# Patient Record
Sex: FEMALE | Race: WHITE
[De-identification: names, ages, dates, MRNs, and addresses within clinical notes are randomized per-mention and may not be internally consistent; named-entity substitution may affect disease eponyms.]

---

## 2018-05-07 ENCOUNTER — HOSPITAL ENCOUNTER (OUTPATIENT)
Dept: HOSPITAL 92 - RAD | Age: 23
Discharge: HOME | End: 2018-05-07
Attending: INTERNAL MEDICINE
Payer: COMMERCIAL

## 2018-05-07 DIAGNOSIS — R06.00: Primary | ICD-10-CM

## 2018-05-07 PROCEDURE — 80053 COMPREHEN METABOLIC PANEL: CPT

## 2018-05-07 PROCEDURE — 36415 COLL VENOUS BLD VENIPUNCTURE: CPT

## 2018-05-07 PROCEDURE — 85025 COMPLETE CBC W/AUTO DIFF WBC: CPT

## 2018-05-07 PROCEDURE — 82103 ALPHA-1-ANTITRYPSIN TOTAL: CPT

## 2018-05-07 PROCEDURE — 71046 X-RAY EXAM CHEST 2 VIEWS: CPT

## 2018-05-07 NOTE — RAD
CHEST PA AND LATERAL 2 VIEWS:

 

Date:  05/07/18 

 

HISTORY:  

22-year-old female with history of dyspnea. 

 

FINDINGS:

Heart size is within normal limits. Lungs appear clear. No confluent pneumonia, overt edema, or pleur
al effusion. 

 

IMPRESSION: 

No acute intrathoracic disease. No old studies. 

 

 

POS: [facility]

## 2018-05-15 ENCOUNTER — HOSPITAL ENCOUNTER (OUTPATIENT)
Dept: HOSPITAL 92 - NM | Age: 23
Discharge: HOME | End: 2018-05-15
Attending: INTERNAL MEDICINE
Payer: COMMERCIAL

## 2018-05-15 DIAGNOSIS — I26.99: Primary | ICD-10-CM

## 2018-05-15 PROCEDURE — 94727 GAS DIL/WSHOT DETER LNG VOL: CPT

## 2018-05-15 PROCEDURE — 94729 DIFFUSING CAPACITY: CPT

## 2018-05-15 PROCEDURE — 71046 X-RAY EXAM CHEST 2 VIEWS: CPT

## 2018-05-15 PROCEDURE — A9558 XE133 XENON 10MCI: HCPCS

## 2018-05-15 PROCEDURE — 94060 EVALUATION OF WHEEZING: CPT

## 2018-05-15 PROCEDURE — A9540 TC99M MAA: HCPCS

## 2018-05-15 PROCEDURE — 78582 LUNG VENTILAT&PERFUS IMAGING: CPT

## 2018-05-15 NOTE — RAD
CHEST 2 VIEWS:

 

Date:  05/15/18 

 

HISTORY:  

Dyspnea. 

 

COMPARISON:  

05/07/18. 

FINDINGS:

Cardiac silhouette and pulmonary vasculature are unremarkable. Mediastinum is midline. Lungs are well
 inflated. No confluent air space consolidation, pneumothorax, or pleural fluid. 

 

IMPRESSION: 

No active cardiopulmonary abnormalities are demonstrated. 

 

 

POS: [facility]

## 2018-05-15 NOTE — NM
RADIONUCLIDE VENTILSION PERFUSION LUNG SCAN:

 

Date:  05/15/18 

 

HISTORY:  

Chest pain. Dyspnea. 

 

FINDINGS:

Ventilation images show good uptake of radiotracer throughout each lung with good washout. Perfusion 
images show good perfusion gradient. No segmental or subsegmental perfusion mismatches. 

 

IMPRESSION: 

Normal radionuclide ventilation perfusion lung scan. 

 

 

POS: [facility]